# Patient Record
Sex: FEMALE | Race: WHITE | NOT HISPANIC OR LATINO | URBAN - METROPOLITAN AREA
[De-identification: names, ages, dates, MRNs, and addresses within clinical notes are randomized per-mention and may not be internally consistent; named-entity substitution may affect disease eponyms.]

---

## 2018-07-26 ENCOUNTER — EMERGENCY (EMERGENCY)
Facility: HOSPITAL | Age: 73
LOS: 1 days | Discharge: HOME | End: 2018-07-26
Attending: EMERGENCY MEDICINE
Payer: MEDICARE

## 2018-07-26 VITALS
RESPIRATION RATE: 18 BRPM | TEMPERATURE: 98 F | DIASTOLIC BLOOD PRESSURE: 70 MMHG | HEART RATE: 65 BPM | SYSTOLIC BLOOD PRESSURE: 145 MMHG | OXYGEN SATURATION: 97 %

## 2018-07-26 VITALS
WEIGHT: 188.05 LBS | TEMPERATURE: 98 F | HEIGHT: 64 IN | OXYGEN SATURATION: 99 % | DIASTOLIC BLOOD PRESSURE: 70 MMHG | RESPIRATION RATE: 20 BRPM | SYSTOLIC BLOOD PRESSURE: 140 MMHG | HEART RATE: 60 BPM

## 2018-07-26 LAB
ALBUMIN SERPL ELPH-MCNC: 3.8 G/DL — SIGNIFICANT CHANGE UP (ref 3.3–5.2)
ALP SERPL-CCNC: 55 U/L — SIGNIFICANT CHANGE UP (ref 40–120)
ALT FLD-CCNC: 15 U/L — SIGNIFICANT CHANGE UP
ANION GAP SERPL CALC-SCNC: 12 MMOL/L — SIGNIFICANT CHANGE UP (ref 5–17)
APTT BLD: 30.7 SEC — SIGNIFICANT CHANGE UP (ref 27.5–37.4)
AST SERPL-CCNC: 15 U/L — SIGNIFICANT CHANGE UP
BILIRUB SERPL-MCNC: 0.5 MG/DL — SIGNIFICANT CHANGE UP (ref 0.4–2)
BUN SERPL-MCNC: 23 MG/DL — HIGH (ref 8–20)
CALCIUM SERPL-MCNC: 9.4 MG/DL — SIGNIFICANT CHANGE UP (ref 8.6–10.2)
CHLORIDE SERPL-SCNC: 101 MMOL/L — SIGNIFICANT CHANGE UP (ref 98–107)
CK SERPL-CCNC: 63 U/L — SIGNIFICANT CHANGE UP (ref 25–170)
CO2 SERPL-SCNC: 28 MMOL/L — SIGNIFICANT CHANGE UP (ref 22–29)
CREAT SERPL-MCNC: 0.63 MG/DL — SIGNIFICANT CHANGE UP (ref 0.5–1.3)
GLUCOSE SERPL-MCNC: 119 MG/DL — HIGH (ref 70–115)
HCT VFR BLD CALC: 40 % — SIGNIFICANT CHANGE UP (ref 37–47)
HGB BLD-MCNC: 13.2 G/DL — SIGNIFICANT CHANGE UP (ref 12–16)
INR BLD: 1.02 RATIO — SIGNIFICANT CHANGE UP (ref 0.88–1.16)
LACTATE BLDV-MCNC: 0.8 MMOL/L — SIGNIFICANT CHANGE UP (ref 0.5–2)
LYMPHOCYTES # BLD AUTO: 1.1 K/UL — SIGNIFICANT CHANGE UP (ref 1–4.8)
LYMPHOCYTES # BLD AUTO: 10 % — LOW (ref 20–55)
MAGNESIUM SERPL-MCNC: 2.1 MG/DL — SIGNIFICANT CHANGE UP (ref 1.6–2.6)
MCHC RBC-ENTMCNC: 29.8 PG — SIGNIFICANT CHANGE UP (ref 27–31)
MCHC RBC-ENTMCNC: 33 G/DL — SIGNIFICANT CHANGE UP (ref 32–36)
MCV RBC AUTO: 90.3 FL — SIGNIFICANT CHANGE UP (ref 81–99)
MONOCYTES # BLD AUTO: 0.4 K/UL — SIGNIFICANT CHANGE UP (ref 0–0.8)
MONOCYTES NFR BLD AUTO: 4 % — SIGNIFICANT CHANGE UP (ref 3–10)
NEUTROPHILS # BLD AUTO: 9.1 K/UL — HIGH (ref 1.8–8)
NEUTROPHILS NFR BLD AUTO: 84 % — HIGH (ref 37–73)
NEUTS BAND # BLD: 2 % — SIGNIFICANT CHANGE UP (ref 0–8)
PLAT MORPH BLD: NORMAL — SIGNIFICANT CHANGE UP
PLATELET # BLD AUTO: 236 K/UL — SIGNIFICANT CHANGE UP (ref 150–400)
POTASSIUM SERPL-MCNC: 2.9 MMOL/L — CRITICAL LOW (ref 3.5–5.3)
POTASSIUM SERPL-SCNC: 2.9 MMOL/L — CRITICAL LOW (ref 3.5–5.3)
PROT SERPL-MCNC: 6.5 G/DL — LOW (ref 6.6–8.7)
PROTHROM AB SERPL-ACNC: 11.2 SEC — SIGNIFICANT CHANGE UP (ref 9.8–12.7)
RBC # BLD: 4.43 M/UL — SIGNIFICANT CHANGE UP (ref 4.4–5.2)
RBC # FLD: 14.6 % — SIGNIFICANT CHANGE UP (ref 11–15.6)
RBC BLD AUTO: SIGNIFICANT CHANGE UP
SODIUM SERPL-SCNC: 141 MMOL/L — SIGNIFICANT CHANGE UP (ref 135–145)
TROPONIN T SERPL-MCNC: <0.01 NG/ML — SIGNIFICANT CHANGE UP (ref 0–0.06)
WBC # BLD: 10.6 K/UL — SIGNIFICANT CHANGE UP (ref 4.8–10.8)
WBC # FLD AUTO: 10.6 K/UL — SIGNIFICANT CHANGE UP (ref 4.8–10.8)

## 2018-07-26 PROCEDURE — 83605 ASSAY OF LACTIC ACID: CPT

## 2018-07-26 PROCEDURE — 83690 ASSAY OF LIPASE: CPT

## 2018-07-26 PROCEDURE — 74178 CT ABD&PLV WO CNTR FLWD CNTR: CPT | Mod: 26

## 2018-07-26 PROCEDURE — 96374 THER/PROPH/DIAG INJ IV PUSH: CPT | Mod: XU

## 2018-07-26 PROCEDURE — 99284 EMERGENCY DEPT VISIT MOD MDM: CPT | Mod: 25

## 2018-07-26 PROCEDURE — 85027 COMPLETE CBC AUTOMATED: CPT

## 2018-07-26 PROCEDURE — 36415 COLL VENOUS BLD VENIPUNCTURE: CPT

## 2018-07-26 PROCEDURE — 80053 COMPREHEN METABOLIC PANEL: CPT

## 2018-07-26 PROCEDURE — 83735 ASSAY OF MAGNESIUM: CPT

## 2018-07-26 PROCEDURE — 71045 X-RAY EXAM CHEST 1 VIEW: CPT | Mod: 26

## 2018-07-26 PROCEDURE — 82550 ASSAY OF CK (CPK): CPT

## 2018-07-26 PROCEDURE — 74178 CT ABD&PLV WO CNTR FLWD CNTR: CPT

## 2018-07-26 PROCEDURE — 84484 ASSAY OF TROPONIN QUANT: CPT

## 2018-07-26 PROCEDURE — 85610 PROTHROMBIN TIME: CPT

## 2018-07-26 PROCEDURE — 93010 ELECTROCARDIOGRAM REPORT: CPT

## 2018-07-26 PROCEDURE — 93005 ELECTROCARDIOGRAM TRACING: CPT

## 2018-07-26 PROCEDURE — 85730 THROMBOPLASTIN TIME PARTIAL: CPT

## 2018-07-26 PROCEDURE — 74174 CTA ABD&PLVS W/CONTRAST: CPT

## 2018-07-26 PROCEDURE — 71045 X-RAY EXAM CHEST 1 VIEW: CPT

## 2018-07-26 RX ORDER — LOSARTAN POTASSIUM 100 MG/1
1 TABLET, FILM COATED ORAL
Qty: 0 | Refills: 0 | COMMUNITY

## 2018-07-26 RX ORDER — POTASSIUM CHLORIDE 20 MEQ
40 PACKET (EA) ORAL ONCE
Qty: 0 | Refills: 0 | Status: COMPLETED | OUTPATIENT
Start: 2018-07-26 | End: 2018-07-26

## 2018-07-26 RX ORDER — METOPROLOL TARTRATE 50 MG
1 TABLET ORAL
Qty: 0 | Refills: 0 | COMMUNITY

## 2018-07-26 RX ORDER — POTASSIUM CHLORIDE 20 MEQ
10 PACKET (EA) ORAL ONCE
Qty: 0 | Refills: 0 | Status: COMPLETED | OUTPATIENT
Start: 2018-07-26 | End: 2018-07-26

## 2018-07-26 RX ORDER — ASPIRIN/CALCIUM CARB/MAGNESIUM 324 MG
1 TABLET ORAL
Qty: 0 | Refills: 0 | COMMUNITY

## 2018-07-26 RX ADMIN — Medication 100 MILLIEQUIVALENT(S): at 12:06

## 2018-07-26 RX ADMIN — Medication 40 MILLIEQUIVALENT(S): at 12:06

## 2018-07-26 NOTE — ED PROVIDER NOTE - OBJECTIVE STATEMENT
73 y/o F, with hx of HTN, presents to the ED c/o palpitations and abd pain, onset this morning.  Pt states that at 3:00AM, she woke up with a flutter in her chest lasting for approximately 10 seconds.  Notes no alleviating or exacerbating factors.  Denies chest pain or SOB.  Notes similar episodes of palpitations in the past and states that she wore a loop recorder for 2 weeks with her cardiologist, which showed no events.  In addition, immediately after palpitations, she developed severe, 10/10 periumbilical and epigastric cramping pain.  Pain lasted several hours, with no alleviating or exacerbating factors.  Associated sx include nausea.  Denies vomiting, diarrhea, fever, chills, dysuria, hematuria, vaginal bleeding or discharge.

## 2018-07-26 NOTE — ED ADULT NURSE NOTE - OBJECTIVE STATEMENT
pt care assumed at 0645, no apparent distress noted at this time. pt received Alert and Oriented to person, place, situation and time laying in bed comfortably with multiple family members at bedside. pt c/o medial lower abd pain that radiates to the epigastric area. pt states pain started around 0300. pt states pain is intermittent. HR is Normal Sinus Rhythm on cardiac monitor, lung sounds are clear b/l, abd is soft and nontender with positive bowel sounds in all four quadrants, skin is warm, dry and appropriate for age and race. pt educated on plan of care, plan of care taught back to RN. proficiency determined from successful pt teach back. will continue to educate pt throughout ED stay.

## 2018-07-26 NOTE — ED PROVIDER NOTE - MEDICAL DECISION MAKING DETAILS
Pt well appearing and has remained NSR on monitor since arrival. Abd soft, non-tender, and no abd pain entire time here and tolerated PO with no difficulty. Given the intermittent palpitation and what the pt described as sudden onset pain out of proportion abd pain, CTA was obtained with no sign of mesenteric ischemia. Pt is well appearing and states that she wore a loop reorder for 2 weeks for these Sx with no events identified. Pt stable for dc

## 2018-08-03 PROBLEM — I10 ESSENTIAL (PRIMARY) HYPERTENSION: Chronic | Status: ACTIVE | Noted: 2018-07-26

## 2018-08-03 PROBLEM — Z00.00 ENCOUNTER FOR PREVENTIVE HEALTH EXAMINATION: Status: ACTIVE | Noted: 2018-08-03

## 2018-09-11 DIAGNOSIS — R00.2 PALPITATIONS: ICD-10-CM

## 2018-09-12 ENCOUNTER — APPOINTMENT (OUTPATIENT)
Dept: ELECTROPHYSIOLOGY | Facility: CLINIC | Age: 73
End: 2018-09-12

## 2018-09-12 VITALS
HEART RATE: 65 BPM | SYSTOLIC BLOOD PRESSURE: 147 MMHG | RESPIRATION RATE: 18 BRPM | BODY MASS INDEX: 30.38 KG/M2 | OXYGEN SATURATION: 98 % | DIASTOLIC BLOOD PRESSURE: 88 MMHG | WEIGHT: 182.32 LBS | HEIGHT: 65 IN

## 2018-09-12 DIAGNOSIS — R51 HEADACHE: ICD-10-CM

## 2018-09-12 DIAGNOSIS — Z82.49 FAMILY HISTORY OF ISCHEMIC HEART DISEASE AND OTHER DISEASES OF THE CIRCULATORY SYSTEM: ICD-10-CM

## 2018-09-12 DIAGNOSIS — R06.02 SHORTNESS OF BREATH: ICD-10-CM

## 2018-09-12 DIAGNOSIS — Z87.39 PERSONAL HISTORY OF OTHER DISEASES OF THE MUSCULOSKELETAL SYSTEM AND CONNECTIVE TISSUE: ICD-10-CM

## 2018-09-12 DIAGNOSIS — Z87.19 PERSONAL HISTORY OF OTHER DISEASES OF THE DIGESTIVE SYSTEM: ICD-10-CM

## 2018-09-12 DIAGNOSIS — I10 ESSENTIAL (PRIMARY) HYPERTENSION: ICD-10-CM

## 2018-09-12 DIAGNOSIS — Z86.79 PERSONAL HISTORY OF OTHER DISEASES OF THE CIRCULATORY SYSTEM: ICD-10-CM

## 2018-09-12 DIAGNOSIS — Z63.4 DISAPPEARANCE AND DEATH OF FAMILY MEMBER: ICD-10-CM

## 2018-09-12 DIAGNOSIS — Z78.9 OTHER SPECIFIED HEALTH STATUS: ICD-10-CM

## 2018-09-12 DIAGNOSIS — Z60.2 PROBLEMS RELATED TO LIVING ALONE: ICD-10-CM

## 2018-09-12 DIAGNOSIS — I49.9 CARDIAC ARRHYTHMIA, UNSPECIFIED: ICD-10-CM

## 2018-09-12 DIAGNOSIS — F41.9 ANXIETY DISORDER, UNSPECIFIED: ICD-10-CM

## 2018-09-12 RX ORDER — LOSARTAN POTASSIUM 50 MG/1
50 TABLET, FILM COATED ORAL TWICE DAILY
Refills: 0 | Status: ACTIVE | COMMUNITY
Start: 2018-09-12

## 2018-09-12 RX ORDER — HYDROCHLOROTHIAZIDE 25 MG/1
25 TABLET ORAL DAILY
Refills: 0 | Status: ACTIVE | COMMUNITY
Start: 2018-09-12

## 2018-09-12 RX ORDER — ALPRAZOLAM 0.5 MG/1
0.5 TABLET ORAL AT BEDTIME
Refills: 0 | Status: ACTIVE | COMMUNITY
Start: 2018-09-12

## 2018-09-12 RX ORDER — CLONIDINE HYDROCHLORIDE 0.1 MG/1
0.1 TABLET ORAL TWICE DAILY
Refills: 0 | Status: ACTIVE | COMMUNITY
Start: 2018-09-12

## 2018-09-12 RX ORDER — METOPROLOL SUCCINATE 25 MG/1
25 TABLET, EXTENDED RELEASE ORAL DAILY
Refills: 0 | Status: ACTIVE | COMMUNITY
Start: 2018-09-12

## 2018-09-12 RX ORDER — ASPIRIN ENTERIC COATED TABLETS 81 MG 81 MG/1
81 TABLET, DELAYED RELEASE ORAL DAILY
Refills: 0 | Status: ACTIVE | COMMUNITY
Start: 2018-09-12

## 2018-09-12 SDOH — SOCIAL STABILITY - SOCIAL INSECURITY: DISSAPEARANCE AND DEATH OF FAMILY MEMBER: Z63.4

## 2018-09-12 SDOH — SOCIAL STABILITY - SOCIAL INSECURITY: PROBLEMS RELATED TO LIVING ALONE: Z60.2

## 2018-10-31 ENCOUNTER — APPOINTMENT (OUTPATIENT)
Dept: ELECTROPHYSIOLOGY | Facility: CLINIC | Age: 73
End: 2018-10-31

## 2018-12-26 ENCOUNTER — APPOINTMENT (OUTPATIENT)
Dept: ELECTROPHYSIOLOGY | Facility: CLINIC | Age: 73
End: 2018-12-26

## 2022-09-08 ENCOUNTER — APPOINTMENT (OUTPATIENT)
Dept: RHEUMATOLOGY | Facility: CLINIC | Age: 77
End: 2022-09-08

## 2024-08-09 ENCOUNTER — EMERGENCY (EMERGENCY)
Facility: HOSPITAL | Age: 79
LOS: 1 days | Discharge: HOME | End: 2024-08-09
Attending: EMERGENCY MEDICINE
Payer: MEDICARE

## 2024-08-09 VITALS
DIASTOLIC BLOOD PRESSURE: 89 MMHG | OXYGEN SATURATION: 98 % | HEART RATE: 80 BPM | SYSTOLIC BLOOD PRESSURE: 206 MMHG | TEMPERATURE: 98 F | WEIGHT: 160.06 LBS | RESPIRATION RATE: 20 BRPM

## 2024-08-09 VITALS — SYSTOLIC BLOOD PRESSURE: 135 MMHG | DIASTOLIC BLOOD PRESSURE: 90 MMHG

## 2024-08-09 LAB
ALBUMIN SERPL ELPH-MCNC: 4.1 G/DL — SIGNIFICANT CHANGE UP (ref 3.3–5.2)
ALP SERPL-CCNC: 65 U/L — SIGNIFICANT CHANGE UP (ref 40–120)
ALT FLD-CCNC: 22 U/L — SIGNIFICANT CHANGE UP
ANION GAP SERPL CALC-SCNC: 14 MMOL/L — SIGNIFICANT CHANGE UP (ref 5–17)
APPEARANCE UR: CLEAR — SIGNIFICANT CHANGE UP
AST SERPL-CCNC: 22 U/L — SIGNIFICANT CHANGE UP
BACTERIA # UR AUTO: NEGATIVE /HPF — SIGNIFICANT CHANGE UP
BILIRUB SERPL-MCNC: 0.5 MG/DL — SIGNIFICANT CHANGE UP (ref 0.4–2)
BILIRUB UR-MCNC: NEGATIVE — SIGNIFICANT CHANGE UP
BUN SERPL-MCNC: 29.6 MG/DL — HIGH (ref 8–20)
CALCIUM SERPL-MCNC: 9.4 MG/DL — SIGNIFICANT CHANGE UP (ref 8.4–10.5)
CAST: 2 /LPF — SIGNIFICANT CHANGE UP (ref 0–4)
CHLORIDE SERPL-SCNC: 104 MMOL/L — SIGNIFICANT CHANGE UP (ref 96–108)
CO2 SERPL-SCNC: 25 MMOL/L — SIGNIFICANT CHANGE UP (ref 22–29)
COLOR SPEC: YELLOW — SIGNIFICANT CHANGE UP
CREAT SERPL-MCNC: 0.72 MG/DL — SIGNIFICANT CHANGE UP (ref 0.5–1.3)
DIFF PNL FLD: ABNORMAL
EGFR: 87 ML/MIN/1.73M2 — SIGNIFICANT CHANGE UP
GLUCOSE SERPL-MCNC: 115 MG/DL — HIGH (ref 70–99)
GLUCOSE UR QL: NEGATIVE MG/DL — SIGNIFICANT CHANGE UP
HCT VFR BLD CALC: 39.6 % — SIGNIFICANT CHANGE UP (ref 34.5–45)
HGB BLD-MCNC: 13.3 G/DL — SIGNIFICANT CHANGE UP (ref 11.5–15.5)
KETONES UR-MCNC: 15 MG/DL
LEUKOCYTE ESTERASE UR-ACNC: ABNORMAL
LIDOCAIN IGE QN: 41 U/L — SIGNIFICANT CHANGE UP (ref 22–51)
MCHC RBC-ENTMCNC: 32 PG — SIGNIFICANT CHANGE UP (ref 27–34)
MCHC RBC-ENTMCNC: 33.6 GM/DL — SIGNIFICANT CHANGE UP (ref 32–36)
MCV RBC AUTO: 95.2 FL — SIGNIFICANT CHANGE UP (ref 80–100)
NITRITE UR-MCNC: NEGATIVE — SIGNIFICANT CHANGE UP
PH UR: 5.5 — SIGNIFICANT CHANGE UP (ref 5–8)
PLATELET # BLD AUTO: 243 K/UL — SIGNIFICANT CHANGE UP (ref 150–400)
POTASSIUM SERPL-MCNC: 4.3 MMOL/L — SIGNIFICANT CHANGE UP (ref 3.5–5.3)
POTASSIUM SERPL-SCNC: 4.3 MMOL/L — SIGNIFICANT CHANGE UP (ref 3.5–5.3)
PROT SERPL-MCNC: 6.9 G/DL — SIGNIFICANT CHANGE UP (ref 6.6–8.7)
PROT UR-MCNC: SIGNIFICANT CHANGE UP MG/DL
RBC # BLD: 4.16 M/UL — SIGNIFICANT CHANGE UP (ref 3.8–5.2)
RBC # FLD: 12.9 % — SIGNIFICANT CHANGE UP (ref 10.3–14.5)
RBC CASTS # UR COMP ASSIST: 13 /HPF — HIGH (ref 0–4)
SODIUM SERPL-SCNC: 143 MMOL/L — SIGNIFICANT CHANGE UP (ref 135–145)
SP GR SPEC: 1.03 — SIGNIFICANT CHANGE UP (ref 1–1.03)
SQUAMOUS # UR AUTO: 2 /HPF — SIGNIFICANT CHANGE UP (ref 0–5)
UROBILINOGEN FLD QL: 1 MG/DL — SIGNIFICANT CHANGE UP (ref 0.2–1)
WBC # BLD: 13.76 K/UL — HIGH (ref 3.8–10.5)
WBC # FLD AUTO: 13.76 K/UL — HIGH (ref 3.8–10.5)
WBC UR QL: 25 /HPF — HIGH (ref 0–5)

## 2024-08-09 PROCEDURE — 81001 URINALYSIS AUTO W/SCOPE: CPT

## 2024-08-09 PROCEDURE — 87086 URINE CULTURE/COLONY COUNT: CPT

## 2024-08-09 PROCEDURE — 96375 TX/PRO/DX INJ NEW DRUG ADDON: CPT

## 2024-08-09 PROCEDURE — 74177 CT ABD & PELVIS W/CONTRAST: CPT | Mod: 26,MC

## 2024-08-09 PROCEDURE — 36415 COLL VENOUS BLD VENIPUNCTURE: CPT

## 2024-08-09 PROCEDURE — 99285 EMERGENCY DEPT VISIT HI MDM: CPT

## 2024-08-09 PROCEDURE — 99284 EMERGENCY DEPT VISIT MOD MDM: CPT | Mod: 25

## 2024-08-09 PROCEDURE — 83690 ASSAY OF LIPASE: CPT

## 2024-08-09 PROCEDURE — 85027 COMPLETE CBC AUTOMATED: CPT

## 2024-08-09 PROCEDURE — 80053 COMPREHEN METABOLIC PANEL: CPT

## 2024-08-09 PROCEDURE — 74177 CT ABD & PELVIS W/CONTRAST: CPT | Mod: MC

## 2024-08-09 PROCEDURE — 96374 THER/PROPH/DIAG INJ IV PUSH: CPT | Mod: XU

## 2024-08-09 RX ORDER — CLONIDINE 500 UG/ML
0.2 INJECTION, SOLUTION EPIDURAL ONCE
Refills: 0 | Status: COMPLETED | OUTPATIENT
Start: 2024-08-09 | End: 2024-08-09

## 2024-08-09 RX ORDER — ACETAMINOPHEN 500 MG
1000 TABLET ORAL ONCE
Refills: 0 | Status: COMPLETED | OUTPATIENT
Start: 2024-08-09 | End: 2024-08-09

## 2024-08-09 RX ORDER — ONDANSETRON HCL/PF 4 MG/2 ML
4 VIAL (ML) INJECTION ONCE
Refills: 0 | Status: COMPLETED | OUTPATIENT
Start: 2024-08-09 | End: 2024-08-09

## 2024-08-09 RX ADMIN — CLONIDINE 0.2 MILLIGRAM(S): 500 INJECTION, SOLUTION EPIDURAL at 14:44

## 2024-08-09 RX ADMIN — Medication 400 MILLIGRAM(S): at 14:45

## 2024-08-09 RX ADMIN — Medication 4 MILLIGRAM(S): at 14:45

## 2024-08-09 NOTE — ED PROVIDER NOTE - ATTENDING CONTRIBUTION TO CARE
Owen: I performed a face to face evaluation of this patient and performed a full history and physical examination on the patient.  I agree with the resident's history, physical examination, and plan of the patient unless otherwise noted. My brief assessment is as follows: 78 year old female PMHx uncontrolled HTN, HLD, kidney stones, diverticulitis p/w b/l lower back pain wrappigna round to front of abdomen with urgency/sensation of retention. states had some small urine upon arrive, no dysuria/hematuria. no f/c. +n/v. no cp/sob. no new leg weakness/numbness. didn't take her bp meds. will check labs, ct, symptom control,

## 2024-08-09 NOTE — ED PROVIDER NOTE - OBJECTIVE STATEMENT
78 year old female PMHx uncontrolled HTN, HLD, kidney stones, diverticulitis presents with 6 hours of bilateral back pain that radiates the abdomen and urinary retention with the urge to urinate. Patient states the pain is intense and sharp. The pain is constant but has moments where it intensifies to 10/10. Pain is not relieved by repositioning. Patient took ibuprofen with no relief. Endorses nausea and dry heaving. Denies vomiting. Patient states that she has not been compliant with her at home medications.    Denies fever, malaise, weakness. Denies hematuria. Denies diarrhea. Denies bloody bowel movements.

## 2024-08-09 NOTE — ED ADULT TRIAGE NOTE - CHIEF COMPLAINT QUOTE
Pt BIBA c/o lower back pain.  Reports accompany nausea, vomiting. Reports feeling like she has to urinate but can't.  No burning, blood in urine.  Denies injury. Hx of HTN.  Did not take all meds today.

## 2024-08-09 NOTE — ED ADULT NURSE NOTE - OBJECTIVE STATEMENT
Pt is AxOx3 c/o flank pain and increased urgency to urinate, along with N/V since this am/ Pt is restless due to the pain. Breathing even and unlabored. Pt is aware of plan of care.

## 2024-08-09 NOTE — ED ADULT NURSE NOTE - CHIEF COMPLAINT
The patient is a 78y Female complaining of back pain general. Breath sounds are clear, no distress present, no wheeze, rales, rhonchi or tachypnea. Normal rate and effort.

## 2024-08-09 NOTE — ED PROVIDER NOTE - CLINICAL SUMMARY MEDICAL DECISION MAKING FREE TEXT BOX
78 year old female PMHx uncontrolled HTN, HLD, kidney stones, diverticulitis presents with 6 hours of bilateral back pain that radiates the abdomen and urinary retention with the urge to urinate. On arrival /89, repeat 158/79. Otherwise patient is stable but appearing in moderate pain. Neurologically intact with abdominal/ flank tenderness with reported urinary retention. History and exam concerning for diverticulitis/urinary outlet obstruction and less likely abdominal aortic aneurism/aortic dissection. Obtain bladder scan and UA if able to void. Pain control with IV acetaminophen. Basic labs and urinalysis. Obtain CT abdomen/pelvis w contrast. 78 year old female PMHx uncontrolled HTN, HLD, kidney stones, diverticulitis presents with 6 hours of bilateral back pain that radiates the abdomen and urinary retention with the urge to urinate. On arrival /89, repeat 158/79. Otherwise patient is stable but appearing in moderate pain. Neurologically intact with abdominal/ flank tenderness with reported urinary retention. History and exam concerning for diverticulitis/urinary outlet obstruction/UTI and less likely abdominal aortic aneurism/aortic dissection. Bladder scan shows 36cc total volume. Obtain UA if able to void. Pain control with IV acetaminophen. Basic labs and urinalysis. Obtain CT abdomen/pelvis w contrast. 78 year old female PMHx uncontrolled HTN, HLD, kidney stones, diverticulitis presents with 6 hours of bilateral back pain that radiates the abdomen and urinary retention with the urge to urinate. On arrival /89, repeat 158/79. Otherwise patient is stable but appearing in moderate pain. Neurologically intact with abdominal/ flank tenderness with reported urinary retention. History and exam concerning for diverticulitis/urinary outlet obstruction/UTI and less likely abdominal aortic aneurism/aortic dissection. Achieved adequate pain control with IV acetaminophen. Patient with mildly elevated WBC count 13.76 of CT abdomen/pelvis w contrast shows 5 mm right UVJ calculus with moderate hydronephrosis. UA shows sterile pyuria with some RBCs, likley 2/2 calculus. Patient reevaluated and had resolution of back pain, which could signify passing of kidney stone past the UVJ and into the bladder. Voiding appropriately and without pain and hematuria. Patient stable for discharge. Provided with follow up [facility]. OTC pain medications. Educated on return precautions.     Patient was explained hospital course, risks and benefits of treatment, and discharge planning, along with follow-up. Patient expresses understanding of all of the above. Patient is medically stable for discharge with appropriate followup.

## 2024-08-09 NOTE — ED PROVIDER NOTE - NSFOLLOWUPINSTRUCTIONS_ED_ALL_ED_FT
idney Stones  A body outline of the urinary tract with a close-up of a kidney showing kidney stones.  Kidney stones are solid, rock-like deposits that form inside of the kidneys. The kidneys are a pair of organs that make urine. A kidney stone may form in a kidney and move into other parts of the urinary tract, including the tubes that connect the kidneys to the bladder (ureters), the bladder, and the tube that carries urine out of the body (urethra). As the stone moves through these areas, it can cause intense pain and block the flow of urine.    Kidney stones are created when high levels of certain minerals are found in the urine. The stones are usually passed out of the body through urination, but in some cases, medical treatment may be needed to remove them.    What are the causes?  Kidney stones may be caused by:  A condition in which certain glands produce too much parathyroid hormone (primary hyperparathyroidism), which causes too much calcium buildup in the blood.  A buildup of uric acid crystals in the bladder (hyperuricosuria). Uric acid is a chemical that the body produces when you eat certain foods. It usually leaves the body in the urine.  Narrowing (stricture) of one or both of the ureters.  A kidney blockage that is present at birth (congenital obstruction).  Past surgery on the kidney or the ureters.  What increases the risk?  The following factors may make you more likely to develop this condition:  Having had a kidney stone in the past.  Having a family history of kidney stones.  Not drinking enough water.  Eating a diet that is high in protein, salt (sodium), or sugar.  Being overweight or obese.  What are the signs or symptoms?  Symptoms of a kidney stone may include:  Pain in the side of the abdomen, right below the ribs (flank pain). Pain usually spreads (radiates) to the groin.  Needing to urinate often or urgently.  Painful urination.  Blood in the urine (hematuria).  Nausea.  Vomiting.  Fever and chills.  How is this diagnosed?  This condition may be diagnosed based on:  Your symptoms and medical history.  A physical exam.  Blood tests.  Urine tests. These may be done before and after the stone passes out of your body through urination.  Imaging tests, such as a CT scan, abdominal X-ray, or ultrasound.  A procedure to examine the inside of the bladder (cystoscopy).  How is this treated?  Treatment for kidney stones depends on the size, location, and makeup of the stones. Kidney stones will often pass out of the body through urination. You may need to:  Increase your fluid intake to help pass the stone. In some cases, you may be given fluids through an IV and may need to be monitored in the hospital.  Take medicine for pain.  Make changes in your diet to help prevent kidney stones from coming back.  Sometimes, procedures are needed to remove a kidney stone. This may involve:  A procedure to break up kidney stones using:  A focused beam of light (laser therapy).  Shock waves (extracorporeal shock wave lithotripsy).  Surgery to remove kidney stones. This may be needed if you have severe pain or have stones that block your urinary tract.  Follow these instructions at home:  Medicines    Take over-the-counter and prescription medicines only as told by your health care provider.  Ask your health care provider if the medicine prescribed to you requires you to avoid driving or using heavy machinery.  Eating and drinking    Drink enough fluid to keep your urine pale yellow. You may be instructed to drink at least 8–10 glasses of water each day. This will help you pass the kidney stone.  If directed, change your diet. This may include:  Limiting how much sodium you eat.  Eating more fruits and vegetables.  Limiting how much animal protein you eat. Animal proteins include red meat, poultry, fish, and eggs.  Eating a normal amount of calcium (1,000–1,300 mg per day).  Follow instructions from your health care provider about eating or drinking restrictions.  General instructions    Collect urine samples as told by your health care provider. You may need to collect a urine sample:  24 hours after you pass the stone.  8–12 weeks after you pass the kidney stone, and every 6–12 months after that.  Strain your urine every time you urinate, for as long as directed. Use the strainer that your health care provider recommends.  Do not throw out the kidney stone after passing it. Keep the stone so it can be tested by your health care provider. Testing the makeup of your kidney stone may help prevent you from getting kidney stones in the future.  Keep all follow-up visits. You may need follow-up X-rays or ultrasounds to make sure that your stone has passed.  How is this prevented?  A comparison of three sample cups showing dark yellow, yellow, and pale yellow urine.  To prevent another kidney stone:  Drink enough fluid to keep your urine pale yellow. This is the best way to prevent kidney stones.  Eat a healthy diet. Follow recommendations from your health care provider about foods to avoid. Recommendations vary depending on the type of kidney stone that you have. You may be instructed to eat a low-protein diet.  Maintain a healthy weight.  Where to find more information  [employer] Kid[employer] Foundation (NKF): www.kidney.org  [facility] [facility] (F): www.urologyhealth.org  Contact a health care provider if:  You have pain that gets worse or does not get better with medicine.  Get help right away if:  You have a fever or chills.  You develop severe pain.  You develop new abdominal pain.  You faint.  You are unable to urinate.  Summary  Kidney stones are solid, rock-like deposits that form inside of the kidneys.  Kidney stones can cause nausea, vomiting, blood in the urine, abdominal pain, and the urge to urinate often.  Treatment for kidney stones depends on the size, location, and makeup of the stones. Kidney stones will often pass out of the body through urination.  Kidney stones can be prevented by drinking enough fluids, eating a healthy diet, and maintaining a healthy weight.  This information is not intended to replace advice given to you by your health care provider. Make sure you discuss any questions you have with your health care provider.

## 2024-08-09 NOTE — ED ADULT NURSE NOTE - NSFALLUNIVINTERV_ED_ALL_ED
Bed/Stretcher in lowest position, wheels locked, appropriate side rails in place/Call bell, personal items and telephone in reach/Instruct patient to call for assistance before getting out of bed/chair/stretcher/Non-slip footwear applied when patient is off stretcher/[place] to call system/Physically safe environment - no spills, clutter or unnecessary equipment/Purposeful proactive rounding/Room/bathroom lighting operational, light cord in reach right eye

## 2024-08-09 NOTE — ED PROVIDER NOTE - PATIENT PORTAL LINK FT
You can access the FollowMyHealth Patient Portal offered by  by registering at the following website: [email or web address]. By joining [employer]’s FollowMyHealth portal, you will also be able to view your health information using other applications (apps) compatible with our system.

## 2024-08-09 NOTE — ED ADULT NURSE NOTE - SUICIDE SCREENING DEPRESSION
Patient called stating she dropped off her Stool sample on 5/26 at EM lab and Imaging desk and patient hasn't received her results yet.    Please advise [phone]     Negative

## 2024-08-09 NOTE — ED ADULT NURSE NOTE - NSFALLOOBATTEMPT_ED_ALL_ED
Patient has a neck abcess and needs to be seen asap. Please call station  to help schedule form patient. Ok to leave a detailed message.     No

## 2024-08-09 NOTE — ED PROVIDER NOTE - PHYSICAL EXAMINATION
Gen: Obese women laying in bed favoring her R side, appearing uncomfortable  HENT: Atraumatic, normocephalic, neck is supple without adenopathy, no JVD  Eyes: PERRLA, conjunctivae are clear, non-icteric sclera  NEURO: A&Ox3, no focal deficits, moving all extremities spontaneously, CN II-XII grossly intact  Resp: CTAB, no wrr, non-labored breathing  Cardio: Regular rate and rhytum, S1/S2 heard, no murmurs, gallops or rubs  Abdominal: Soft, mild generalized abdominal tenderess, non-distended, no appreciable masses, normoactive bowel sounds  : No CVA tenderness, palpation of b/l flank elicits pain  Extremities: Nontender, no clubbing, cyanosis, or edema  Skin: Warm, dry, intact without rashes or lesions

## 2024-08-09 NOTE — ED PROVIDER NOTE - NSICDXPASTMEDICALHX_GEN_ALL_CORE_FT
PAST MEDICAL HISTORY:  History of diverticulitis     History of kidney stones     HLD (hyperlipidemia)     HTN (hypertension) uncontrolled

## 2024-08-11 LAB
CULTURE RESULTS: SIGNIFICANT CHANGE UP
SPECIMEN SOURCE: SIGNIFICANT CHANGE UP
